# Patient Record
(demographics unavailable — no encounter records)

---

## 2025-07-24 NOTE — HISTORY OF PRESENT ILLNESS
[de-identified] : cold and cough started yesterday [FreeTextEntry6] : coughing and runny nose (little green mucus)  sneezing afebrile norm ladonna and bm blister on nose from the cold